# Patient Record
(demographics unavailable — no encounter records)

---

## 2024-12-06 NOTE — DATA REVIEWED
[FreeTextEntry1] : 5/3/2024 ([facility]) Bilateral screening mammogram: Extremely dense, no mammographic evidence of malignancy. Normal risk assessment and supplemental screening with sonography is also recommended given extremely dense tissue. Negative BI-RADS 1  5/15/2024 ([facility]) Bilateral breast US: Right breast 10:00 5 cm FN there is a 0.6 x 0.4 x 0.9 cm simple cyst. Left breast 10:00 6 cm FN there is an oval circumscribed hypoechoic nodule 1.4 x 0.6 x 1.1 cm, ultrasound-guided biopsy is recommended. BI-RADS 4  6/21/2024 (R) left breast 10:00 6 cm FN US core BX, 1.3 cm mass (wing clip): Fibroadenoma, concordant, recommend 6 month follow up US.  12/5/2024 (R) bilateral breast MRI in the inner central right breast 3:00 2.5 cm FN there is a 1.1 x 0.8 x 0.8 cm ovoid enhancing mass, no sonographic findings identified in this region on 5/15/2024, may represent fibroadenoma, targeted ultrasound is recommended for further evaluation.  BI-RADS 0  12/5/2024 (R) targeted left breast US: Previously biopsied left breast 10:00 6 cm FN hypoechoic mass measuring 1.1 x 0.8 x 1.1 cm with results reported as a fibroadenoma, stable in appearance and size, patient will be due for annual screening mammogram in May 2025.  Benign BI-RADS 3

## 2024-12-06 NOTE — DATA REVIEWED
Pt was updated and is in agreement with plan. Pt plans on going to  near home. No additional questions at this time. Advised to call back with questions.   [FreeTextEntry1] : 5/3/2024 ([facility]) Bilateral screening mammogram: Extremely dense, no mammographic evidence of malignancy. Normal risk assessment and supplemental screening with sonography is also recommended given extremely dense tissue. Negative BI-RADS 1  5/15/2024 ([facility]) Bilateral breast US: Right breast 10:00 5 cm FN there is a 0.6 x 0.4 x 0.9 cm simple cyst. Left breast 10:00 6 cm FN there is an oval circumscribed hypoechoic nodule 1.4 x 0.6 x 1.1 cm, ultrasound-guided biopsy is recommended. BI-RADS 4  6/21/2024 (R) left breast 10:00 6 cm FN US core BX, 1.3 cm mass (wing clip): Fibroadenoma, concordant, recommend 6 month follow up US.  12/5/2024 (R) bilateral breast MRI in the inner central right breast 3:00 2.5 cm FN there is a 1.1 x 0.8 x 0.8 cm ovoid enhancing mass, no sonographic findings identified in this region on 5/15/2024, may represent fibroadenoma, targeted ultrasound is recommended for further evaluation.  BI-RADS 0  12/5/2024 (R) targeted left breast US: Previously biopsied left breast 10:00 6 cm FN hypoechoic mass measuring 1.1 x 0.8 x 1.1 cm with results reported as a fibroadenoma, stable in appearance and size, patient will be due for annual screening mammogram in May 2025.  Benign BI-RADS 3

## 2024-12-06 NOTE — PLAN
[TextEntry] : Targeted right breast US to be completed now, will notify results If ultrasound returns benign, bilateral screening mammogram & US in May 2025 Follow-up May 2025

## 2024-12-06 NOTE — HISTORY OF PRESENT ILLNESS
[FreeTextEntry1] : CAITLIN is a 30 year old female, accompanied by her boyfriend Jeferson, who presents for follow up evaluation of abnormal breast imaging and biopsy proven left breast 10:00 fibroadenoma undergoing US monitoring. The patient underwent a bilateral screening mammogram & US In May 2024, ultrasound findings revealed an oval circumscribed hypoechoic mass in the left breast 10:00 6 cm FN measuring 1.4 cm recommended for ultrasound-guided core biopsy.  The patient underwent the ultrasound-guided core biopsy of the left breast 10:00 6 cm FN mass on 6/21/2024 yielding a fibroadenoma. Denies palpable abnormalities of the breast, no skin changes/dimpling, no nipple discharge bilaterally. Denies history of breast biopsy or breast surgery.  ESME lifetime risk of 30.9%; family history of breast cancer in mother diagnosed at age 40 and again with a contralateral breast cancer at age 50.  Patient's mother believes she underwent genetic testing for BRCA 1 and 2 and was reportedly negative, has not had updated panel testing.  Patient states her 2 sisters underwent genetic testing a few years ago with reportedly negative results.

## 2024-12-06 NOTE — FAMILY HISTORY
[TextEntry] : Mother, breast cancer age 40, with contralateral breast cancer age 50 (reportedly BRCA negative, has not undergone panel genetic testing)  Patient has 2 sisters unaffected by cancer, both underwent reportedly panel genetic testing with negative results Denies family history of ovarian cancer, colon cancer, pancreatic cancer, prostate cancer, melanoma

## 2024-12-06 NOTE — PAST MEDICAL HISTORY
[Menstruating] : The patient is menstruating [Menarche Age ____] : age at menarche was [unfilled] [Approximately ___] : the LMP was approximately [unfilled] [Regular Cycle Intervals] : have been regular [Total Preg ___] : G[unfilled] [History of Hormone Replacement Treatment] : has no history of hormone replacement treatment [FreeTextEntry5] : NONE [FreeTextEntry6] : NONE [FreeTextEntry7] : VESTURA  [FreeTextEntry8] : NONE

## 2024-12-06 NOTE — ASSESSMENT
[FreeTextEntry1] : 30-year-old female, at high risk for breast cancer, abnormal breast imaging, left breast biopsy-proven fibroadenoma, family history of breast cancer  #At high risk for breast cancer Patient with strong family history of breast cancer, lifetime ESME risk of 26.1%, followed in the high risk screening breast program.    #Abnormal breast imaging Reviewed with the patient clinical breast exam findings which were within normal limits.  Discussed the patient's breast MRI results completed today which identified a benign-appearing nodule in the right breast, not appreciated on previous ultrasound from May 2024 with the recommendation to proceed with a targeted right breast US for further evaluation of this finding.  The patient will proceed with a targeted right breast US today for further evaluation, will notify results.  Discussed with the patient if the ultrasound results returned benign, recommend patient proceed with a bilateral screening mammogram & US in May 2025 and follow-up at that time.  # Fibroadenoma of the left breast Reviewed with the patient the targeted left breast US results completed today showing the biopsy-proven fibroadenoma in the left breast 10:00 6 CMFN has remained stable unchanged.  #Family history of breast cancer Previously discussed importance and implication of genetic testing in regards to their family history.  The patient states that she is unsure of her mother underwent updated panel testing as of yet, she states she will follow-up with her mother regarding this and we will plan to revisit at patient's follow-up appointment to determine if patient is indicated for a genetics consult.

## 2024-12-06 NOTE — DATA REVIEWED
[FreeTextEntry1] : 5/3/2024 ([facility]) Bilateral screening mammogram: Extremely dense, no mammographic evidence of malignancy. Normal risk assessment and supplemental screening with sonography is also recommended given extremely dense tissue. Negative BI-RADS 1  5/15/2024 ([facility]) Bilateral breast US: Right breast 10:00 5 cm FN there is a 0.6 x 0.4 x 0.9 cm simple cyst. Left breast 10:00 6 cm FN there is an oval circumscribed hypoechoic nodule 1.4 x 0.6 x 1.1 cm, ultrasound-guided biopsy is recommended. BI-RADS 4  6/21/2024 (R) left breast 10:00 6 cm FN US core BX, 1.3 cm mass (wing clip): Fibroadenoma, concordant, recommend 6 month follow up US.  12/5/2024 (R) bilateral breast MRI in the inner central right breast 3:00 2.5 cm FN there is a 1.1 x 0.8 x 0.8 cm ovoid enhancing mass, no sonographic findings identified in this region on 5/15/2024, may represent fibroadenoma, targeted ultrasound is recommended for further evaluation.  BI-RADS 0  12/5/2024 (R) targeted left breast US: Previously biopsied left breast 10:00 6 cm FN hypoechoic mass measuring 1.1 x 0.8 x 1.1 cm with results reported as a fibroadenoma, stable in appearance and size, patient will be due for annual screening mammogram in May 2025.  Benign BI-RADS 3

## 2025-07-24 NOTE — DATA REVIEWED
[FreeTextEntry1] : 5/3/2024 ([facility]) Bilateral screening mammogram: Extremely dense, no mammographic evidence of malignancy. Normal risk assessment and supplemental screening with sonography is also recommended given extremely dense tissue. Negative BI-RADS 1  5/15/2024 ([facility]) Bilateral breast US: Right breast 10:00 5 cm FN there is a 0.6 x 0.4 x 0.9 cm simple cyst. Left breast 10:00 6 cm FN there is an oval circumscribed hypoechoic nodule 1.4 x 0.6 x 1.1 cm, ultrasound-guided biopsy is recommended. BI-RADS 4  6/21/2024 ([facility]) left breast 10:00 6 cm FN US core BX, 1.3 cm mass (wing clip): Fibroadenoma, concordant, recommend 6 month follow up US.  12/5/2024 (R) bilateral breast MRI in the inner central right breast 3:00 2.5 cm FN there is a 1.1 x 0.8 x 0.8 cm ovoid enhancing mass, no sonographic findings identified in this region on 5/15/2024, may represent fibroadenoma, targeted ultrasound is recommended for further evaluation.  BI-RADS 0  12/5/2024 ([facility]) targeted left breast US: Previously biopsied left breast 10:00 6 cm FN hypoechoic mass measuring 1.1 x 0.8 x 1.1 cm with results reported as a fibroadenoma, stable in appearance and size, patient will be due for annual screening mammogram in May 2025.  Benign BI-RADS 3  12/5/2024 ([facility]) targeted right breast US: Right 1:00 retroareolar 1.5 x 0.6 x 1.0 cm parallel heterogeneously hypoechoic mass, given MRI characteristics for the finding or equivocal for fibroadenoma, ultrasound-guided biopsy is recommended. BI-RADS 4.  1/8/25 ([facility]) right breast 1:00 retroareolar US bx (coil clip): fibrocystic changes associated with calcifications. Pathology results indicate that the specimen is benign. The pathology results are concordant with the imaging. A six-month follow up MRI is recommended (due in July), especially since this was visible on recent MRI scan. Note is made that the patient would be due for bilateral diagnostic mammography and targeted right ultrasound in May 2025.  7/24/2025 (R) bilateral screening mammogram & US: Heterogeneously dense, right 1:00 0 cm FN 1.1 cm hypoechoic mass, similar in appearance since January at which time was biopsied benign.  No mammographic or sonographic evidence of malignancy.  Benign BI-RADS 2  7/24/2025 (R) bilateral breast MRI: PENDING

## 2025-07-24 NOTE — PLAN
[TextEntry] : Breast MRI results pending If MRI returns benign, bilateral screening mammogram & US July 2026 Follow-up July 2026 Consult with genetic counselor discussed indication for panel genetic testing

## 2025-07-24 NOTE — HISTORY OF PRESENT ILLNESS
[FreeTextEntry1] : CAITLIN is a 31year old female presents for high risk screening follow-up and follow-up of abnormal breast imaging.  Patient has a history of left breast 10:00 6 cm FN mass biopsy-proven benign (6/21/2024) yielding a fibroadenoma and right breast 1:00 ultrasound core biopsy proven benign finding (1/8/2025).  The patient is followed for a family history of breast cancer.  Denies palpable abnormalities of the breast, no skin changes/dimpling, no nipple discharge bilaterally. Denies history of breast biopsy or breast surgery.  ESME lifetime risk of 30.9%; family history of breast cancer in mother diagnosed at age 40 and again with a contralateral breast cancer at age 50.  Patient's mother believes she underwent genetic testing for BRCA 1 and 2 and was reportedly negative, has not had updated panel testing.  Patient states her 2 sisters underwent genetic testing a few years ago with reportedly negative results.

## 2025-07-24 NOTE — ASSESSMENT
[FreeTextEntry1] : 31-year-old female, at high risk for breast cancer, abnormal breast imaging, left breast biopsy-proven fibroadenoma, family history of breast cancer  #At high risk for breast cancer, #Abnormal breast imaging Patient with strong family history of breast cancer, lifetime ESME risk of 26.1%, followed in the high risk screening breast program.  Reviewed with the patient clinical breast exam findings which were within normal limits.  Patient underwent a bilateral screening mammogram & US today results returned benign.  Discussed with the patient the findings of the right breast biopsy-proven benign nodule and left breast biopsy-proven benign nodule have remained stable and unchanged.  The patient underwent a breast MRI for 6-month follow-up status post benign right breast biopsy.  The results of the MRI are pending.  Discussed with the patient if the MRI returns benign, the patient is to proceed with a bilateral screening mammogram & US in July 2026 and follow-up at that time.  We will then plan to push out her breast MRI to January 2027 if her mammogram and ultrasound in July returned benign.  #Family history of breast cancer Patient with a family history of breast cancer in her mother diagnosed at 40 and then with contralateral breast cancer at age 50.  Discussed importance and implication of genetic testing in regards to their family history.  The patient's mother did not undergo updated panel testing.  Discussed with the patient the option to meet with genetic counselor to discuss the indication for panel genetic testing given her mother's history of early onset breast cancer.  The patient was amenable and will be referred. I advised the patient that their medical management may change based on these results.

## 2025-07-24 NOTE — PAST MEDICAL HISTORY
[Menstruating] : The patient is menstruating [Menarche Age ____] : age at menarche was [unfilled] [Regular Cycle Intervals] : have been regular [Total Preg ___] : G[unfilled] [Definite ___ (Date)] : the last menstrual period was [unfilled] [History of Hormone Replacement Treatment] : has no history of hormone replacement treatment [FreeTextEntry5] : NONE [FreeTextEntry6] : NONE [FreeTextEntry7] : VESTURA  [FreeTextEntry8] : NONE